# Patient Record
Sex: MALE | Race: WHITE | NOT HISPANIC OR LATINO | URBAN - METROPOLITAN AREA
[De-identification: names, ages, dates, MRNs, and addresses within clinical notes are randomized per-mention and may not be internally consistent; named-entity substitution may affect disease eponyms.]

---

## 2020-05-31 ENCOUNTER — EMERGENCY (EMERGENCY)
Facility: HOSPITAL | Age: 75
LOS: 0 days | Discharge: HOME | End: 2020-06-01
Attending: EMERGENCY MEDICINE
Payer: MEDICARE

## 2020-05-31 VITALS
TEMPERATURE: 98 F | WEIGHT: 179.9 LBS | DIASTOLIC BLOOD PRESSURE: 99 MMHG | HEART RATE: 67 BPM | SYSTOLIC BLOOD PRESSURE: 194 MMHG | HEIGHT: 72 IN | RESPIRATION RATE: 20 BRPM | OXYGEN SATURATION: 98 %

## 2020-05-31 PROCEDURE — 99285 EMERGENCY DEPT VISIT HI MDM: CPT

## 2020-05-31 NOTE — ED ADULT TRIAGE NOTE - CHIEF COMPLAINT QUOTE
pt c/o  intermittent MID-LEFT back pain x 5 hours. pt stated he had  the same pain 2 weeks ago. H/o glaucoma. denies covid/ contacts pt c/o  intermittent MID-LEFT back pain x 5 hours. pt stated he had  the same pain 2 weeks ago. H/o glaucoma. denies covid/ contacts. states he is not feeling pain at the moment, because he had some green tea  PTA

## 2020-05-31 NOTE — ED PROVIDER NOTE - OBJECTIVE STATEMENT
Pertinent PMH/PSH/FHx/SHx and Review of Systems contained within:     75 y/o M with pmhx glaucoma presents to ED for evaluation of intermittent left upper back / flank pain. Pt states pain is sharp and subsided after drinking green tea. Denies trauma radiation of pain , CP, diarrhea SOB. Pt smokes marijuana for his glaucoma.    No fever/chills, No photophobia/eye pain/changes in vision, No ear pain/sore throat/dysphagia, No chest pain/palpitations, no SOB/cough/wheeze/stridor, No abdominal pain, No N/V/D, no dysuria/frequency/discharge, No neck, + back pain, no rash, no changes in neurological status/function. Pertinent PMH/PSH/FHx/SHx and Review of Systems contained within:     73 y/o M with pmhx glaucoma presents to ED for evaluation of intermittent left upper back / flank pain.  Pt states pain is sharp and subsided after drinking green tea. Denies trauma, known muscle strain, radiation of pain, CP, diarrhea, SOB, known sick contacts.  Pt states he had one episode of pain last week, then second episode occurred today.  Currently in ED pt denies pain & declines pain meds.    No fever/chills, No photophobia/eye pain/changes in vision, No ear pain/sore throat/dysphagia, No chest pain/palpitations, no SOB/cough/wheeze/stridor, No abdominal pain, No N/V/D, no dysuria/frequency/discharge, No neck, + back pain, no rash, no changes in neurological status/function.

## 2020-05-31 NOTE — ED PROVIDER NOTE - PATIENT PORTAL LINK FT
You can access the FollowMyHealth Patient Portal offered by [facility] by registering at the following website: [email or web address]. By joining [facility]’s FollowMyHealth portal, you will also be able to view your health information using other applications (apps) compatible with our system.

## 2020-05-31 NOTE — ED PROVIDER NOTE - PHYSICAL EXAMINATION
Gen: Alert, NAD, speaking in complete sentences  Head: NC, AT, EOMI, normal lids/conjunctiva  ENT: normal hearing, patent oropharynx, MMM  Neck: supple, no tenderness/meningismus/JVD, Trachea midline  Pulm: Bilateral clear BS, normal resp effort, no wheeze/stridor/retractions  CV: RRR, no M/R/G, +dist pulses  Abd: soft, NT/ND, +BS, no guarding/rebound tenderness  Mskel: no edema/erythema/cyanosis, FROM in all ext, sensation intact, motor 5/5 and equal in upper & lower ext bilaterally  Skin: no rash  Neuro: AAOx3, no sensory/motor deficits

## 2020-05-31 NOTE — ED PROVIDER NOTE - CLINICAL SUMMARY MEDICAL DECISION MAKING FREE TEXT BOX
Pt w above dx, BP improved w/o intervention.  Labs & imaging neg for acute pathology.  Discussed results and outcome of testing with the patient, given copy as well.  Patient advised to please follow up with their primary care doctor within the next 24 hours and return to the Emergency Department for worsening symptoms or any other concerns.  Patient advised that their doctor may call  to follow up on the specific results of the tests performed today in the emergency department.

## 2020-06-01 VITALS
HEART RATE: 60 BPM | DIASTOLIC BLOOD PRESSURE: 90 MMHG | RESPIRATION RATE: 18 BRPM | OXYGEN SATURATION: 99 % | SYSTOLIC BLOOD PRESSURE: 154 MMHG

## 2020-06-01 LAB
ALBUMIN SERPL ELPH-MCNC: 4.1 G/DL — SIGNIFICANT CHANGE UP (ref 3.3–5)
ALP SERPL-CCNC: 60 U/L — SIGNIFICANT CHANGE UP (ref 40–120)
ALT FLD-CCNC: 25 U/L — SIGNIFICANT CHANGE UP (ref 12–78)
ANION GAP SERPL CALC-SCNC: 7 MMOL/L — SIGNIFICANT CHANGE UP (ref 5–17)
APPEARANCE UR: CLEAR — SIGNIFICANT CHANGE UP
APTT BLD: 34.8 SEC — SIGNIFICANT CHANGE UP (ref 27.5–36.3)
AST SERPL-CCNC: 16 U/L — SIGNIFICANT CHANGE UP (ref 15–37)
BASOPHILS # BLD AUTO: 0.04 K/UL — SIGNIFICANT CHANGE UP (ref 0–0.2)
BASOPHILS NFR BLD AUTO: 0.5 % — SIGNIFICANT CHANGE UP (ref 0–2)
BILIRUB SERPL-MCNC: 0.9 MG/DL — SIGNIFICANT CHANGE UP (ref 0.2–1.2)
BILIRUB UR-MCNC: NEGATIVE — SIGNIFICANT CHANGE UP
BUN SERPL-MCNC: 18 MG/DL — SIGNIFICANT CHANGE UP (ref 7–23)
CALCIUM SERPL-MCNC: 8.7 MG/DL — SIGNIFICANT CHANGE UP (ref 8.5–10.1)
CHLORIDE SERPL-SCNC: 110 MMOL/L — HIGH (ref 96–108)
CO2 SERPL-SCNC: 27 MMOL/L — SIGNIFICANT CHANGE UP (ref 22–31)
COLOR SPEC: YELLOW — SIGNIFICANT CHANGE UP
CREAT SERPL-MCNC: 1.15 MG/DL — SIGNIFICANT CHANGE UP (ref 0.5–1.3)
D DIMER BLD IA.RAPID-MCNC: 172 NG/ML DDU — SIGNIFICANT CHANGE UP
DIFF PNL FLD: NEGATIVE — SIGNIFICANT CHANGE UP
EOSINOPHIL # BLD AUTO: 0.14 K/UL — SIGNIFICANT CHANGE UP (ref 0–0.5)
EOSINOPHIL NFR BLD AUTO: 1.9 % — SIGNIFICANT CHANGE UP (ref 0–6)
GLUCOSE SERPL-MCNC: 90 MG/DL — SIGNIFICANT CHANGE UP (ref 70–99)
GLUCOSE UR QL: NEGATIVE MG/DL — SIGNIFICANT CHANGE UP
HCT VFR BLD CALC: 42.3 % — SIGNIFICANT CHANGE UP (ref 39–50)
HGB BLD-MCNC: 14.7 G/DL — SIGNIFICANT CHANGE UP (ref 13–17)
IMM GRANULOCYTES NFR BLD AUTO: 0.1 % — SIGNIFICANT CHANGE UP (ref 0–1.5)
INR BLD: 1.04 RATIO — SIGNIFICANT CHANGE UP (ref 0.88–1.16)
KETONES UR-MCNC: NEGATIVE — SIGNIFICANT CHANGE UP
LACTATE SERPL-SCNC: 1.1 MMOL/L — SIGNIFICANT CHANGE UP (ref 0.7–2)
LEUKOCYTE ESTERASE UR-ACNC: NEGATIVE — SIGNIFICANT CHANGE UP
LIDOCAIN IGE QN: 239 U/L — SIGNIFICANT CHANGE UP (ref 73–393)
LYMPHOCYTES # BLD AUTO: 3.38 K/UL — HIGH (ref 1–3.3)
LYMPHOCYTES # BLD AUTO: 44.7 % — HIGH (ref 13–44)
MCHC RBC-ENTMCNC: 29 PG — SIGNIFICANT CHANGE UP (ref 27–34)
MCHC RBC-ENTMCNC: 34.8 GM/DL — SIGNIFICANT CHANGE UP (ref 32–36)
MCV RBC AUTO: 83.4 FL — SIGNIFICANT CHANGE UP (ref 80–100)
MONOCYTES # BLD AUTO: 0.77 K/UL — SIGNIFICANT CHANGE UP (ref 0–0.9)
MONOCYTES NFR BLD AUTO: 10.2 % — SIGNIFICANT CHANGE UP (ref 2–14)
NEUTROPHILS # BLD AUTO: 3.22 K/UL — SIGNIFICANT CHANGE UP (ref 1.8–7.4)
NEUTROPHILS NFR BLD AUTO: 42.6 % — LOW (ref 43–77)
NITRITE UR-MCNC: NEGATIVE — SIGNIFICANT CHANGE UP
NRBC # BLD: 0 /100 WBCS — SIGNIFICANT CHANGE UP (ref 0–0)
PH UR: 6 — SIGNIFICANT CHANGE UP (ref 5–8)
PLATELET # BLD AUTO: 183 K/UL — SIGNIFICANT CHANGE UP (ref 150–400)
POTASSIUM SERPL-MCNC: 3.8 MMOL/L — SIGNIFICANT CHANGE UP (ref 3.5–5.3)
POTASSIUM SERPL-SCNC: 3.8 MMOL/L — SIGNIFICANT CHANGE UP (ref 3.5–5.3)
PROT SERPL-MCNC: 7.7 GM/DL — SIGNIFICANT CHANGE UP (ref 6–8.3)
PROT UR-MCNC: NEGATIVE MG/DL — SIGNIFICANT CHANGE UP
PROTHROM AB SERPL-ACNC: 11.7 SEC — SIGNIFICANT CHANGE UP (ref 10–12.9)
RBC # BLD: 5.07 M/UL — SIGNIFICANT CHANGE UP (ref 4.2–5.8)
RBC # FLD: 15.4 % — HIGH (ref 10.3–14.5)
SODIUM SERPL-SCNC: 144 MMOL/L — SIGNIFICANT CHANGE UP (ref 135–145)
SP GR SPEC: 1.01 — SIGNIFICANT CHANGE UP (ref 1.01–1.02)
TROPONIN I SERPL-MCNC: <.015 NG/ML — SIGNIFICANT CHANGE UP (ref 0.01–0.04)
UROBILINOGEN FLD QL: NEGATIVE MG/DL — SIGNIFICANT CHANGE UP
WBC # BLD: 7.56 K/UL — SIGNIFICANT CHANGE UP (ref 3.8–10.5)
WBC # FLD AUTO: 7.56 K/UL — SIGNIFICANT CHANGE UP (ref 3.8–10.5)

## 2020-06-01 PROCEDURE — 71275 CT ANGIOGRAPHY CHEST: CPT | Mod: 26

## 2020-06-01 PROCEDURE — 74176 CT ABD & PELVIS W/O CONTRAST: CPT | Mod: 26

## 2020-06-01 PROCEDURE — 93010 ELECTROCARDIOGRAM REPORT: CPT

## 2020-06-01 RX ORDER — AMLODIPINE BESYLATE 2.5 MG/1
5 TABLET ORAL ONCE
Refills: 0 | Status: DISCONTINUED | OUTPATIENT
Start: 2020-06-01 | End: 2020-06-01

## 2020-06-01 NOTE — ED ADULT NURSE NOTE - CHIEF COMPLAINT QUOTE
pt c/o  intermittent MID-LEFT back pain x 5 hours. pt stated he had  the same pain 2 weeks ago. H/o glaucoma. denies covid/ contacts. states he is not feeling pain at the moment, because he had some green tea  PTA

## 2020-06-01 NOTE — ED ADULT NURSE REASSESSMENT NOTE - NS ED NURSE REASSESS COMMENT FT1
Pt presents with intermittent left sided mid T-spine pain. Seen by MORRIS, BPs elevated- placed on monitor with stat CT ordered to R/o dissection. Pt pleasant and cooperative, will [place]...........NAOMIE BERG

## 2020-06-01 NOTE — ED ADULT NURSE NOTE - CHPI ED NUR SYMPTOMS NEG
no shortness of breath/no vomiting/no fever/no nausea/no congestion/no syncope/no chills/no diaphoresis/no chest pain

## 2020-06-02 DIAGNOSIS — M54.9 DORSALGIA, UNSPECIFIED: ICD-10-CM

## 2020-06-02 DIAGNOSIS — H40.9 UNSPECIFIED GLAUCOMA: ICD-10-CM

## 2023-09-11 NOTE — ED PROVIDER NOTE - NS ED SCRIBE STATEMENT
Dear Juan,     What to do in an Emergency:  If you are experiencing a medical emergency, call 911 immediately. Symptoms that require immediate attention require a visit at Urgent Care ([place]), Immediate Care Center ([place]) or the Emergency Room of a nearby hospital.    When to contact a provider:  Contact a provider if symptoms are worsening or there is no improvement.     Do not have a primary care provider?   If you do not have a primary care provider or have any questions about your visit, please call the [facility] RN at [phone].    Billing Questions:   If you have any billing concerns, please contact our Billing Department at [phone]. Hours: Mon. - Thu. 7:30 am - 6 pm and Friday 7:30 pm to 5 pm.    Thank you for entrusting us with your care.     You can connect by Video with a [facility] provider 24/7 for common and non-urgent symptoms. You can also get care by completing an E-Visit questionnaire. Complete a questionnaire by choosing from a list of common health concerns*. A [facility] provider will respond to your questionnaire answers within an 1 hour (24/7). You will receive a treatment plan, including a prescription if you need one, and/or testing for COVID, Influenza, Mono, RSV, Strep and urine, depending on your symptoms.     Cold Symptoms Behavioral   Health Skin Concerns Women's and Men's   Health   Everything Else   Cough*    Anxiety* Acne                                 Birth Control Abdominal Discomfort     COVID treatment* Depression *  Bug Bites   Emergency Contraception Acid Reflux   Nasal congestion* Insomnia Cold Sores Erectile Dysfunction                      Excessive Sweating (underarms)          Red/pink eye  Dandruff Smoking Cessation    Headache or migraine*        Sore throat*  Eczema Urinary Symptoms* Joint pain or stiffness       Sinus infection*    Minor Skin Injuries Vaginal Itching*  Medication Refills*         Poison Ivy Vaginal Discharge* Nausea, vomiting and  diarrhea         Rash   Neck and Back Pain*       Shingles  Seasonal Allergies          Tick bites                ___________________________________________________________________________________________________________________________    COVID Treatment:       Paxlovid is an oral treatment FDA approved for adults. It is available for mild-moderate COVID-19 infections in patients at high risk of progression to severe COVID-19. This medication has been shown to reduce hospitalization and death by 88%.    Notify your healthcare immediately if,  you have renal disease or impairment, you will receive a lower dose.   you did not list all medications you were taking at the time of your visit.There are several medications that interact with Paxlovid.     Drug interactions:   There are several medication interactions with Paxlovid.   Do not take any additional medication other than what the provider instructed.       Instructions for use:     Paxlovid blister pack contains two medications Nirmatrelvir 300 mg (two 150 mg tablets) with Ritonavir 100 mg, all three tablets taken together twice daily x 5 days.   Missed dose: If a dose is missed within 8 hours of usual administration time, the missed dose should be administered as soon as possible, and normal dosing schedule should resume. If a dose is missed by more than 8 hours, the missed dose should not be administered, and dosing should resume at the next scheduled administration time. Do not double the dose to make up for a missed dose.     Medication Interactions:    If you are using a combined hormonal birth control, please use an effective alternative contraceptive method or an additional barrier method of contraception while taking Paxlovid. Paxlovid may reduce the efficacy of combined hormonal contraceptives.    If you are taking levothyroxine for your thyroid, using Paxlovid together with levothyroxine may decrease the affects of levothyroxine.  Please contact your  primary care provider to have your TSH re-evaluated in 30 days.  Contact your primary care provider if you experience tiredness sluggishness, weakness, memory problems, depression, difficulty concentrating, and/or constipation.    If you are taking a medication for your cholesterol, Paxlovid interacts with statin medications. You will need to stop your statin at least 12 hours prior to starting your course of Paxlovid. 5 days after you have completed your Paxlovid course, you may resume taking your statin.      Side effects:   Diarrhea, high blood pressure, body aches, taste disturbance.    COVID Rebound:  You may experience COVID-19 rebound if you have been diagnosed in the past 2 weeks and have recovered from COVID-19. If you experience COVID-19 rebound, restart the recommended 5-day isolation period. You may end isolation after 5 days if symptoms are improving, no fever for 24 hours or use of  of fever-reducing medication.     There is currently no evidence that additional treatment is needed  in cases where COVID-19 rebound is suspected regardless of vaccination status.    Symptoms Management:     Instructions for Adults:     • Take Mucinex (guaifenesin) 600 mg twice daily, this is an expectorant which means it thins the mucus/phlegm so one can blow, cough or spit mucus/phlegm out better.    • If needed you may take Sudafed as instructed on the package, if sinus pressure is present (do not take if you have history of high blood pressure/hypertension, are pregnant or breastfeeding).      • If needed you may take Tylenol every 4 hours as needed for fever or aches (Do not exceed 4,000mg in 24 hours).    • Drink Warm tea with honey.    • Salt water gargles and throat lozenges for sore throat.    Instructions for Children:       • Give Tylenol or ibuprofen as needed for fever.    • Do not give ibuprofen if your child is not drinking enough fluids or are throwing up a lot.   • Use Nose Estefani and nasal saline to help  suction mucus.  • Salt water gargles if able to follow instructions.  • Throat lozenges may be given to children 4 and over for a sore throat. Follow package instructions.   • Do not give over the counter cough and cold medications to children under 6 years of age. These medications can have serious side effects.    • Use mentholated rubs over the chest and front of neck. This can help soothe a cough.    • Honey can also help a cough (do not given to babies under 1 year of age):   1-5 years old give ½ teaspoon.    6-11 years old give 1 teaspoon.   12 and older give 2 teaspoons of honey.        Instructions for Everyone:     • Drink lots of fluids.     • Use a cool mist Humidifier.     • Use a warm moist compress to sinuses 4-6 times daily to help facilitate sinus drainage.      • Sterile saline rinses 3 times daily (nasal saline spray)    • Cover coughs or sneezes.      • Practice good hand hygiene.                __________________________________________________________________________________________________________________________________________  Quarantine Guidelines:     Contact your employee health or human resources department for further guidance on employer specific quarantine and return to work guidelines. If your school age child has symptoms of COVID or has had a COVID exposure, contact your child's school as they may have additional instructions and requirements to return to school.      COVID POSITIVE:   You can end isolation after 5 full days if you are fever-free for 24 hours without the use of fever-reducing medication and your other symptoms have improved (Loss of taste and smell may persist for weeks or months after recovery and need not delay the end of isolation).       Coronavirus Disease 2019 (COVID-19): Overview  Coronavirus disease 2019 (COVID-19) is an illness that infects the lungs. It's caused by a type of coronavirus. The virus is called SARS-CoV-2. There are many types of  coronaviruses. They are a common cause of colds and bronchitis. They can cause a lung infection called pneumonia. Symptoms can range from mild to severe. Some people have no symptoms. These types of viruses are also found in some animals.  Viruses change (mutate) all the time. The changes lead to different forms of a virus. These are called variants. COVID-19 variants may spread more easily from person to person. They may cause milder symptoms. Or they may cause more severe symptoms.   The virus spreads and infects people easily. It can infect a person more easily if they are not immune to it. The virus most often spreads through droplets of fluid that a person coughs or sneezes into the air. In some cases, you can get it from touching a surface with the virus on it and then touching your eyes, nose, or mouth.     To help prevent spreading the infection, wash your hands often, or use an alcohol-based hand .     To learn more  For the latest from the [facility]:     • Go to the [facility] website  • Call [phone]-BUGY ([phone])     What are the symptoms of COVID-19?  Some people have no symptoms. Some have mild symptoms. Others may have severe symptoms. This varies from person to person. Symptoms may start 2 to 14 days after contact with the virus. They can include:  • Fever  • Chills  • Coughing  • Trouble breathing or feeling short of breath  • Sore throat  • Stuffy or runny nose  • Headache  • Body aches  • Tiredness  • Nausea, vomiting, diarrhea, or belly pain  • New loss of sense of smell or taste  Check your symptoms with the CDC’s Coronavirus Self-.  What are possible complications of COVID-19?  The virus can cause an infection in the lungs. This is called pneumonia. This can lead to death in some cases. Experts are still learning more about COVID-19 problems. Problems may include:  • Low blood pressure  • Kidney failure  • Inflammation of the brain or heart  • Rashes  Some people are at higher risk for  problems. This includes:  • Older adults  • People with heart or lung disease  • People with diabetes or kidney disease  • People with health conditions that limit the immune system  • People who take medicines that limit the immune system  Rarely, a child may have a severe complication. This is called multisystem inflammatory syndrome in children (MIS-C). MIS-C seems to be like Kawasaki disease. This is a rare illness. It causes swelling of blood vessels and body organs. MIS can also happen in adults. But this is less common.    How is COVID-19 diagnosed?  Your healthcare provider will ask:  • What symptoms you have  • Where you live  • If you’ve traveled recently  • If you’ve had contact with sick people  • If you are vaccinated against COVID-19  • If you have had COVID-19  Know your testing options with the CDC's COVID-19 Viral Testing Tool. You may have 1 of these tests for COVID-19:  • Viral (molecular) test. You may also hear this called a PCR or RT-PCR test. Viral tests are very accurate. A viral test looks for the genetic material (RNA) of the SARS-CoV-2 virus. There are a few ways to do this. A swab may be wiped inside your nose or throat. Or a long swab may be put into your nose down to the back of your throat. Or a sample of your saliva may be taken. Your test results may be back in 45 minutes to a few hours. This depends on the type of test. Some tests must be sent to a lab. These can take several days for the results. You can now get test kits to use at home. Some of these need a prescription. Follow the instructions in the kit closely if you use a home kit. Some kits show results quickly at home. Others must be sent to a lab for the results.  • Antigen test. This can find proteins from the SARS-CoV-2 virus. A swab may be wiped inside your nose or throat. Or a long swab may be put into your nose down to the back of your throat. Some results are back within 15 to 60 minutes. This depends on the type of  test. Positive results are very accurate. But false positive results can happen. And the results can be negative even in people with COVID-19. Antigen tests are more likely to miss a COVID-19 infection than a viral (molecular) test. You may need to have a viral test if your antigen test is negative but you have symptoms of COVID-19.  • Breath test. This rapid test is not widely available at this time. It finds SARS-CoV-2 infection in the breath. The test is done at providers' offices, hospitals, and mobile testing sites.  You may have other tests if your provider thinks or confirms that you have COVID-19. These tests may include:  • Antibody blood test. This type of test can show if you had the virus in the past. It shows antibodies for the virus in the blood. The accuracy of these tests varies. And they are not available everywhere. An antibody test may not show if you have an infection right now. This is because it can take up to a few weeks for your body to make antibodies. None of the antibody tests can yet be used to tell if a person is immune to the virus.  • Sputum culture. If you have a wet cough, you may be asked to cough up a bit of mucus (sputum) from your lungs. This is tested for the virus. It may be tested for pneumonia.  • Imaging tests. You may have a chest X-ray or CT scan.  Can you get COVID-19 again?  Yes, you can get COVID-19 more than once. You may not have immunity. You could have lost the immunity. Or you may get COVID-19 from a different strain (variant) of the virus that you are not immune to. But the COVID-19 vaccine helps lower the risk for COVID-19.  Vaccines for COVID-19  The FDA and CDC advise vaccines to help prevent COVID-19. The vaccines can also make the illness less severe. It can keep you from needing to go to the hospital.  And it can prevent the spread of the virus to others. No vaccine is 100% effective at preventing an illness. But getting a vaccine is important. COVID-19  vaccines are available for people as young as 6 months old. Pregnant or breastfeeding people can have the vaccine. Vaccines are given as a primary series.Boosters are given later to help with protection.  The vaccines are given as a shot (injection) into the muscle. Ask your healthcare provider which vaccine is best for you and your family. There is a 1-dose vaccine from [employer] ([employer]J) for people ages 18 and older. Or a 2-dose vaccine from Novavax for people ages 12 and older. Two-dose Pfizer and Moderna vaccines are for people as young as 6 months old. They are given in several doses a few weeks apart. People with a weak immune system may have other advice. Talk with your healthcare provider about which vaccine is best for you and your family.  COVID-19 vaccine booster shots  People age 5 or older can get a COVID-19 booster shot. It's given a few months after their primary series. Boosters can help with protection against COVID-19 that may have decreased over time.  Booster advice varies by vaccine, age, health, and COVID-19 variants. Talk with your provider about your risk and when to get a booster.  How is COVID-19 treated?  The best treatments right now are those to help your body while it fights the virus. This is called supportive care. It includes:  • Rest.This helps your body fight the illness.  • Fluids. Try to drink 6 to 8 glasses of fluids every day. Ask your provider which drinks are best for you. Don't have drinks with caffeine or alcohol.  • Over-the-counter (OTC) medicine. These are used to help ease pain and reduce fever. Ask your provider which OTC medicine is safe for you to use.  Talk with your provider if you have confirmed COVID-19. You may qualify for medicines approved by the FDA to prevent severe COVID-19 infection.  You may need to stay in the hospital for severe illness. Your care may include:  • IV fluids. These are given through a vein. This helps to replace fluids in your  body.  • Oxygen. You may be given extra oxygen. Or you may be put on a breathing machine (ventilator). This is done so you get enough oxygen in your body.  • Prone positioning. Your healthcare team may regularly turn you on your stomach. This is called prone positioning. It helps increase the amount of oxygen you get to your lungs. Follow their instructions on position changes while you're in the hospital and at home.  • Antivirals and monoclonal antibodies. The FDA has approved certain antivirals and monoclonal antibodies to treat COVID-19. These treatments are for people who are more likely to get very sick. These treatments are not available for everyone. Talk with your healthcare provider to learn more.  ? Antivirals stop the SARS-CoV-2 virus from spreading in the body.  ? Monoclonal antibodies help the immune system fight the virus.     • Steroids or other anti-inflammatory medicines. These are used to lessen the inflammation that some people with COVID-19 have. Inflammation can lead to more trouble breathing. It can cause other complications or death.  • COVID-19 convalescent plasma. Plasma is the liquid part of blood. People who had COVID-19 may be asked to donate plasma. This is called COVID-19 convalescent plasma. The plasma may have antibodies. These can help fight COVID-19 in people who are very ill with it. Check with your provider to see if this is an option in your area.  Are you at risk for COVID-19?  You are at risk for COVID-19 if any of these apply to you:  • You live in or traveled to an area with cases of COVID-19  • You had close contact (within 6 feet) with someone who had COVID-19  COVID-19 may be spread by people who don't show symptoms.  Date last modified: 9/08/2022   El last reviewed this educational content on 9/1/2021  © 0915-7612 [employer]. All rights reserved. This information is not intended as a substitute for professional medical care. Always follow your  healthcare professional's instructions.         Attending

## 2025-04-08 NOTE — ED PROVIDER NOTE - PMH
Appt made for annual physical with [facility] for 04-.    Gretta Aguilar on 4/8/2025 at 4:20 PM     No pertinent past medical history <<----- Click to add NO pertinent Past Medical History